# Patient Record
Sex: FEMALE | Race: WHITE | NOT HISPANIC OR LATINO | URBAN - METROPOLITAN AREA
[De-identification: names, ages, dates, MRNs, and addresses within clinical notes are randomized per-mention and may not be internally consistent; named-entity substitution may affect disease eponyms.]

---

## 2017-06-15 ENCOUNTER — OUTPATIENT (OUTPATIENT)
Dept: OUTPATIENT SERVICES | Facility: HOSPITAL | Age: 62
LOS: 1 days | End: 2017-06-15
Payer: MEDICARE

## 2017-06-15 PROCEDURE — 74177 CT ABD & PELVIS W/CONTRAST: CPT | Mod: 26

## 2017-06-15 PROCEDURE — 74177 CT ABD & PELVIS W/CONTRAST: CPT

## 2019-01-04 ENCOUNTER — OUTPATIENT (OUTPATIENT)
Dept: OUTPATIENT SERVICES | Facility: HOSPITAL | Age: 64
LOS: 1 days | Discharge: HOME | End: 2019-01-04
Payer: MEDICARE

## 2019-01-04 ENCOUNTER — RESULT REVIEW (OUTPATIENT)
Age: 64
End: 2019-01-04

## 2019-01-04 VITALS
SYSTOLIC BLOOD PRESSURE: 126 MMHG | DIASTOLIC BLOOD PRESSURE: 60 MMHG | HEIGHT: 67 IN | RESPIRATION RATE: 18 BRPM | TEMPERATURE: 98 F | WEIGHT: 179.02 LBS | HEART RATE: 81 BPM | OXYGEN SATURATION: 96 %

## 2019-01-04 VITALS
SYSTOLIC BLOOD PRESSURE: 114 MMHG | HEART RATE: 82 BPM | DIASTOLIC BLOOD PRESSURE: 50 MMHG | RESPIRATION RATE: 16 BRPM | OXYGEN SATURATION: 94 %

## 2019-01-04 DIAGNOSIS — Z98.890 OTHER SPECIFIED POSTPROCEDURAL STATES: Chronic | ICD-10-CM

## 2019-01-04 PROCEDURE — 88307 TISSUE EXAM BY PATHOLOGIST: CPT

## 2019-01-04 PROCEDURE — 58661 LAPAROSCOPY REMOVE ADNEXA: CPT

## 2019-01-04 PROCEDURE — 58120 DILATION AND CURETTAGE: CPT

## 2019-01-04 PROCEDURE — 86901 BLOOD TYPING SEROLOGIC RH(D): CPT

## 2019-01-04 PROCEDURE — S2900: CPT

## 2019-01-04 PROCEDURE — C1889: CPT

## 2019-01-04 PROCEDURE — 86850 RBC ANTIBODY SCREEN: CPT

## 2019-01-04 PROCEDURE — 88305 TISSUE EXAM BY PATHOLOGIST: CPT

## 2019-01-04 PROCEDURE — 86900 BLOOD TYPING SEROLOGIC ABO: CPT

## 2019-01-04 RX ORDER — SODIUM CHLORIDE 9 MG/ML
1000 INJECTION, SOLUTION INTRAVENOUS
Qty: 0 | Refills: 0 | Status: DISCONTINUED | OUTPATIENT
Start: 2019-01-04 | End: 2019-01-04

## 2019-01-04 RX ORDER — OXYCODONE HYDROCHLORIDE 5 MG/1
5 TABLET ORAL ONCE
Qty: 0 | Refills: 0 | Status: DISCONTINUED | OUTPATIENT
Start: 2019-01-04 | End: 2019-01-04

## 2019-01-04 RX ORDER — MORPHINE SULFATE 50 MG/1
4 CAPSULE, EXTENDED RELEASE ORAL
Qty: 0 | Refills: 0 | Status: DISCONTINUED | OUTPATIENT
Start: 2019-01-04 | End: 2019-01-04

## 2019-01-04 RX ORDER — METOCLOPRAMIDE HCL 10 MG
10 TABLET ORAL ONCE
Qty: 0 | Refills: 0 | Status: DISCONTINUED | OUTPATIENT
Start: 2019-01-04 | End: 2019-01-04

## 2019-01-04 RX ORDER — SIMETHICONE 80 MG/1
80 TABLET, CHEWABLE ORAL ONCE
Qty: 0 | Refills: 0 | Status: DISCONTINUED | OUTPATIENT
Start: 2019-01-04 | End: 2019-01-04

## 2019-01-04 RX ORDER — KETOROLAC TROMETHAMINE 30 MG/ML
30 SYRINGE (ML) INJECTION ONCE
Qty: 0 | Refills: 0 | Status: DISCONTINUED | OUTPATIENT
Start: 2019-01-04 | End: 2019-01-04

## 2019-01-04 RX ORDER — OXYCODONE HYDROCHLORIDE 5 MG/1
10 TABLET ORAL ONCE
Qty: 0 | Refills: 0 | Status: DISCONTINUED | OUTPATIENT
Start: 2019-01-04 | End: 2019-01-04

## 2019-01-04 RX ADMIN — MORPHINE SULFATE 4 MILLIGRAM(S): 50 CAPSULE, EXTENDED RELEASE ORAL at 18:45

## 2019-01-04 RX ADMIN — MORPHINE SULFATE 4 MILLIGRAM(S): 50 CAPSULE, EXTENDED RELEASE ORAL at 19:52

## 2019-01-04 RX ADMIN — Medication 30 MILLIGRAM(S): at 19:10

## 2019-01-04 RX ADMIN — MORPHINE SULFATE 4 MILLIGRAM(S): 50 CAPSULE, EXTENDED RELEASE ORAL at 19:10

## 2019-01-04 NOTE — BRIEF OPERATIVE NOTE - PROCEDURE
<<-----Click on this checkbox to enter Procedure Lysis of adhesions  01/04/2019    Active  [email or web address]  Laparoscopic bilateral salpingo-oophorectomy  01/04/2019    Active  [email or web address]

## 2019-01-04 NOTE — BRIEF OPERATIVE NOTE - OPERATION/FINDINGS
robot aborted due to severe and dense omental/bowel adhesions with mesh to anterior abdominal wall  both fallopian tubes and ovaries grossly appeared normal  subcentimeter cyst visualized in right ovary

## 2019-01-04 NOTE — PACU DISCHARGE NOTE - COMMENTS
pt aao x3.  VSS.  denies c/o nausea or pain.  reports understanding of discharge instructions.  IV discontinued.  pt ambulating with steady gait.  pt discharged to lobby via w/c with transport

## 2023-09-27 PROBLEM — M85.80 OTHER SPECIFIED DISORDERS OF BONE DENSITY AND STRUCTURE, UNSPECIFIED SITE: Chronic | Status: ACTIVE | Noted: 2019-01-03

## 2023-09-27 PROBLEM — N80.9 ENDOMETRIOSIS, UNSPECIFIED: Chronic | Status: ACTIVE | Noted: 2019-01-03

## 2023-09-27 PROBLEM — E04.2 NONTOXIC MULTINODULAR GOITER: Chronic | Status: ACTIVE | Noted: 2019-01-03

## 2023-09-27 PROBLEM — M79.7 FIBROMYALGIA: Chronic | Status: ACTIVE | Noted: 2019-01-03

## 2023-09-27 PROBLEM — N83.9 NONINFLAMMATORY DISORDER OF OVARY, FALLOPIAN TUBE AND BROAD LIGAMENT, UNSPECIFIED: Chronic | Status: ACTIVE | Noted: 2019-01-03

## 2024-01-22 ENCOUNTER — APPOINTMENT (OUTPATIENT)
Dept: HEART AND VASCULAR | Facility: CLINIC | Age: 69
End: 2024-01-22

## 2024-01-25 ENCOUNTER — NON-APPOINTMENT (OUTPATIENT)
Age: 69
End: 2024-01-25

## 2024-01-25 ENCOUNTER — APPOINTMENT (OUTPATIENT)
Dept: HEART AND VASCULAR | Facility: CLINIC | Age: 69
End: 2024-01-25
Payer: MEDICARE

## 2024-01-25 VITALS — WEIGHT: 180 LBS | BODY MASS INDEX: 27.92 KG/M2 | HEIGHT: 67.5 IN | TEMPERATURE: 97.8 F

## 2024-01-25 VITALS — DIASTOLIC BLOOD PRESSURE: 70 MMHG | HEART RATE: 74 BPM | SYSTOLIC BLOOD PRESSURE: 145 MMHG

## 2024-01-25 PROCEDURE — 99203 OFFICE O/P NEW LOW 30 MIN: CPT

## 2024-01-25 PROCEDURE — 93000 ELECTROCARDIOGRAM COMPLETE: CPT

## 2024-02-01 RX ORDER — ROSUVASTATIN CALCIUM 5 MG/1
5 TABLET, FILM COATED ORAL
Refills: 0 | Status: ACTIVE | COMMUNITY

## 2024-02-01 NOTE — ADDENDUM
[FreeTextEntry1] : I, Dr. Trung Constantino personally performed the services described in the documentation, reviewed the documentation as recorded by the scribe, in my presence.  It accurately records my words and actions.  I, DANNY Kim, am scribing for and in the presence of Dr. Constantino for the following sections: history of present illness, past medical/surgical/family/social history, medication reconciliation, allergies, review of systems, vital signs, physical exam and disposition.

## 2024-02-01 NOTE — HISTORY OF PRESENT ILLNESS
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 Constantino. She has been feeling more symptomatic lately with dizziness and palpitations. Two weeks ago on 1/15/24 all day long she was very symptomatic with palpations. She also had an episode of near-syncope while brushing her teeth and lowered herself on the floor. She thought she may have pass out for a min or so. She felt very dizzy for the rest of the day. There are times when dizzy spells occur at rest.  When she has palpitations, palps would last for few mins, and she occasionally would get woken up with them.  These symptoms get more frequently the past 2 months.  She states that she walks at least 1 mile/day, but occasionally gets SOB with walking. As such, she saw Pulm and had PFT that was normal.   Outpatient records showed Stress echo was unremarkable. Echo Stress test (9/2023) was inconclusive, but had normal cardiac cath. She had MCOT in 2022 for 13 days which showed no arrhythmia.   All: codeine -- SOB  SHX: no tob / etoh / recreational drug use PSH: B/L knee replacement (2015) C1-C2 fx surgery (1981), C3-C4 surgery, L123 surgery  Home Meds:  Toprol 50 at night.  Rosuvastatin 5 mg 3 times / week

## 2024-02-01 NOTE — DISCUSSION/SUMMARY
[FreeTextEntry1] : 69 y/o F with history of PVC that was ablated 11 years ago, now returns for evaluation of palpitations and dizziness.  Last heart monitor was in 2022.  She has already ruled out coronary artery disease and has normal LVEF on echo.  1. Dizziness / palpitations: recommend Ziopatch for 2 weeks to see if can catch any arrhythmia.  2. SOB:  Suggest f/u with her pulmonologist to rule out pHTN given her concern for SOB.   She can f/u in 6 weeks. [EKG obtained to assist in diagnosis and management of assessed problem(s)] : EKG obtained to assist in diagnosis and management of assessed problem(s)

## 2024-02-01 NOTE — PHYSICAL EXAM
[No Acute Distress] : no acute distress [Normal] : soft, non-tender, no masses/organomegaly, normal bowel sounds [Normal Gait] : normal gait [No Edema] : no edema [No Focal Deficits] : no focal deficits

## 2024-02-08 ENCOUNTER — APPOINTMENT (OUTPATIENT)
Dept: HEART AND VASCULAR | Facility: CLINIC | Age: 69
End: 2024-02-08
Payer: MEDICARE

## 2024-02-08 PROCEDURE — 93248 EXT ECG>7D<15D REV&INTERPJ: CPT

## 2024-02-22 ENCOUNTER — APPOINTMENT (OUTPATIENT)
Dept: HEART AND VASCULAR | Facility: CLINIC | Age: 69
End: 2024-02-22
Payer: MEDICARE

## 2024-02-22 ENCOUNTER — NON-APPOINTMENT (OUTPATIENT)
Age: 69
End: 2024-02-22

## 2024-02-22 VITALS — SYSTOLIC BLOOD PRESSURE: 132 MMHG | HEART RATE: 71 BPM | DIASTOLIC BLOOD PRESSURE: 69 MMHG

## 2024-02-22 VITALS — HEIGHT: 67.5 IN | TEMPERATURE: 97.5 F | WEIGHT: 179 LBS | BODY MASS INDEX: 27.77 KG/M2

## 2024-02-22 PROCEDURE — 99213 OFFICE O/P EST LOW 20 MIN: CPT

## 2024-02-22 PROCEDURE — 93000 ELECTROCARDIOGRAM COMPLETE: CPT

## 2024-02-22 RX ORDER — METOPROLOL SUCCINATE 50 MG/1
50 TABLET, EXTENDED RELEASE ORAL
Refills: 0 | Status: ACTIVE | COMMUNITY
Start: 2024-02-22

## 2024-02-26 NOTE — PHYSICAL EXAM
[No Acute Distress] : no acute distress [Normal Gait] : normal gait [Normal] : soft, non-tender, no masses/organomegaly, normal bowel sounds [No Edema] : no edema [No Focal Deficits] : no focal deficits [Well Developed] : well developed [Normal S1, S2] : normal S1, S2 [Moves all extremities] : moves all extremities [Alert and Oriented] : alert and oriented

## 2024-02-28 NOTE — HISTORY OF PRESENT ILLNESS
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 Constantino, who presents for follow up  She states 12/2023 she noted more palpitations with associated dizziness.  When she has palpitations, palps would last for few mins, and she occasionally would get woken up with them.  She was given a event monitor that showed heart rates [id number] short burst of SVT <5 seconds. 1.8% PVCs - symptoms correlate with NSR or rare pvcs.  Since her last visit she notes that she is feeling better though has occasional palpitations.  NO syncope, chest pain, edema, SOB.  Outpatient records showed Stress echo was unremarkable. Echo Stress test (9/2023) was inconclusive, but had normal cardiac cath. She had MCOT in 2022 for 13 days which showed no significant arrhythmias, with a PVC burden of 1.7% which occasionally correlated to symptoms. Other patient events on the monitor correlated to sinus rhythm..   All: codeine -- SOB  SHX: no tob / etoh / recreational drug use PSH: B/L knee replacement (2015) C1-C2 fx surgery (1981), C3-C4 surgery, L123 surgery

## 2024-02-28 NOTE — ADDENDUM
[FreeTextEntry1] : I, Trung Constantino, hereby attest that the medical record entry for this patient accurately reflects signatures/notations that I made on the Date of Service in my capacity as an Attending Physician when I treated/diagnosed the above patient. I do hereby attest that this information is true, accurate and complete to the best of my knowledge and I understand that any falsification, omission, or concealment of material fact may subject me to administrative, civil, or, criminal liability. I agree with the note as written by my PA in its entirety. I was present for the entire visit and supervised the entire visit and agree with the plan as outlined.   I, Damien Morris, am scribing for and the presence of Dr. Constantino the following sections: HPI, PMH,Family/social history, ROS, Physical Exam, Assessment / Plan.

## 2024-02-28 NOTE — DISCUSSION/SUMMARY
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  Event monitor with infrequent PVCs.  Symptoms correlate with rate PVC and NSR.  She was reassured.  She will remain active and stay hydrated.  SHe overall is feeling better and if symptoms worsen can consider adding low dose BB or CCB.  FOllow up in 4 months or sooner if needed.  She knows to call with any questions or concerns.

## 2024-06-13 ENCOUNTER — NON-APPOINTMENT (OUTPATIENT)
Age: 69
End: 2024-06-13

## 2024-06-13 ENCOUNTER — APPOINTMENT (OUTPATIENT)
Dept: HEART AND VASCULAR | Facility: CLINIC | Age: 69
End: 2024-06-13
Payer: MEDICARE

## 2024-06-13 VITALS
HEIGHT: 67.5 IN | TEMPERATURE: 96.5 F | WEIGHT: 186 LBS | HEART RATE: 74 BPM | BODY MASS INDEX: 28.85 KG/M2 | DIASTOLIC BLOOD PRESSURE: 65 MMHG | SYSTOLIC BLOOD PRESSURE: 142 MMHG | OXYGEN SATURATION: 94 %

## 2024-06-13 DIAGNOSIS — I49.3 VENTRICULAR PREMATURE DEPOLARIZATION: ICD-10-CM

## 2024-06-13 PROCEDURE — 93000 ELECTROCARDIOGRAM COMPLETE: CPT

## 2024-06-13 PROCEDURE — 99214 OFFICE O/P EST MOD 30 MIN: CPT

## 2024-06-13 NOTE — PHYSICAL EXAM
[Well Developed] : well developed [No Acute Distress] : no acute distress [Normal S1, S2] : normal S1, S2 [Normal] : clear lung fields, good air entry, no respiratory distress [Normal Gait] : normal gait [No Edema] : no edema [Moves all extremities] : moves all extremities [Alert and Oriented] : alert and oriented [Well Nourished] : well nourished [Normal Conjunctiva] : normal conjunctiva [Clear Lung Fields] : clear lung fields [Good Air Entry] : good air entry [No Respiratory Distress] : no respiratory distress  [Normal Speech] : normal speech

## 2024-06-19 NOTE — DISCUSSION/SUMMARY
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  Event monitor in the past 6 months with infrequent PVCs.  Symptoms correlate with rate PVC and NSR.  She had one episode of rapid palpitations lasting less then a minute.  We discussed this is likely an SVT (AT) but that we need to be aware of longer episodes.  We discussed with age she is at risk for atrial fibrillation.  We encouraged monitoring with [employer] or an apple watch.  If she has longer episodes we can consider a event monitor.  We discussed the utility of a implantable loop recorder.  For now she will use external monitoring.  She will increase her Crestor to 5 times a week.   She will remain active and stay hydrated.   Follow up in 4 months or sooner if needed.  She knows to call with any questions or concerns.  Is Xerosis Present?: Yes - Normal Treatment

## 2024-06-19 NOTE — HISTORY OF PRESENT ILLNESS
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  When she has palpitations, palps would last for few mins, and she occasionally would get woken up with them.  She was given a event monitor that showed heart rates [id number] short burst of SVT <5 seconds. 1.8% PVCs - symptoms correlate with NSR or rare pvcs.  Since her last visit she has overall done well.  She does not feel PVCs much but states last week she noted 50 seconds of rapid palpitations.  She has never felt anything like this before.  No clear precipitating factor and no alleviating factors.  She is upset her LDL is up.  She just joined a gym and swims 3 days a week to try and help lose some weight.  No syncope, chest pain, edema, SOB.  Outpatient records showed Stress echo was unremarkable. Echo Stress test (9/2023) was inconclusive, but had normal cardiac cath. She had MCOT in 2022 for 13 days which showed no significant arrhythmias, with a PVC burden of 1.7% which occasionally correlated to symptoms. Other patient events on the monitor correlated to sinus rhythm..

## 2024-06-19 NOTE — CARDIOLOGY SUMMARY
[de-identified] : 1/25/24: NSR 77 bpm. Non-specific ST changes in lateral leads.  2/22/24 NSR at 71bpm 6/7/24 sinus at 77bpm

## 2024-06-19 NOTE — REVIEW OF SYSTEMS
[Negative] : Heme/Lymph [Fever] : no fever [Chills] : no chills [Feeling Fatigued] : not feeling fatigued [Cough] : no cough [Wheezing] : no wheezing [FreeTextEntry5] : see HPI

## 2024-09-11 ENCOUNTER — NON-APPOINTMENT (OUTPATIENT)
Age: 69
End: 2024-09-11

## 2024-09-11 ENCOUNTER — APPOINTMENT (OUTPATIENT)
Dept: HEART AND VASCULAR | Facility: CLINIC | Age: 69
End: 2024-09-11
Payer: MEDICARE

## 2024-09-11 VITALS
SYSTOLIC BLOOD PRESSURE: 139 MMHG | WEIGHT: 179 LBS | OXYGEN SATURATION: 99 % | TEMPERATURE: 97.6 F | HEART RATE: 67 BPM | DIASTOLIC BLOOD PRESSURE: 77 MMHG | HEIGHT: 67.5 IN | BODY MASS INDEX: 27.77 KG/M2

## 2024-09-11 DIAGNOSIS — I49.3 VENTRICULAR PREMATURE DEPOLARIZATION: ICD-10-CM

## 2024-09-11 PROCEDURE — 93000 ELECTROCARDIOGRAM COMPLETE: CPT

## 2024-09-11 PROCEDURE — G2211 COMPLEX E/M VISIT ADD ON: CPT

## 2024-09-11 PROCEDURE — 99213 OFFICE O/P EST LOW 20 MIN: CPT

## 2024-09-15 NOTE — CARDIOLOGY SUMMARY
[de-identified] : 1/25/24: NSR 77 bpm. Non-specific ST changes in lateral leads.  2/22/24 NSR at 71bpm 6/7/24 sinus at 77bpm 9/11/24  sinus at 62

## 2024-09-15 NOTE — DISCUSSION/SUMMARY
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 Constantino, who presents for follow up  She has already ruled out coronary artery disease and has normal LVEF on echo.  Event monitor in the past 6 months with infrequent PVCs.  Symptoms correlate with rate PVC and NSR.  She will continue to use her smart watch.  If she has longer episodes we can consider an event monitor.  We discussed the utility of a implantable loop recorder.  For now she will use external monitoring.  She will increase her Crestor to 5 times a week.   She will remain active and stay hydrated.   Follow up in 6 months or sooner if needed.  She knows to call with any questions or concerns.  [EKG obtained to assist in diagnosis and management of assessed problem(s)] : EKG obtained to assist in diagnosis and management of assessed problem(s)

## 2024-09-15 NOTE — DISCUSSION/SUMMARY
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 Constantino, who presents for follow up  She has already ruled out coronary artery disease and has normal LVEF on echo.  Event monitor in the past 6 months with infrequent PVCs.  Symptoms correlate with rate PVC and NSR.  She will continue to use her smart watch.  If she has longer episodes we can consider an event monitor.  We discussed the utility of a implantable loop recorder.  For now she will use external monitoring.  She will increase her Crestor to 5 times a week.   She will remain active and stay hydrated.   Follow up in 6 months or sooner if needed.  She knows to call with any questions or concerns.  [EKG obtained to assist in diagnosis and management of assessed problem(s)] : EKG obtained to assist in diagnosis and management of assessed problem(s)

## 2024-09-15 NOTE — ADDENDUM
[FreeTextEntry1] : I, Trung Consatntino, hereby attest that the medical record entry for this patient accurately reflects signatures/notations that I made on the Date of Service in my capacity as an Attending Physician when I treated/diagnosed the above patient. I do hereby attest that this information is true, accurate and complete to the best of my knowledge and I understand that any falsification, omission, or concealment of material fact may subject me to administrative, civil, or, criminal liability. I agree with the note as written by my PA in its entirety. I was present for the entire visit and supervised the entire visit and agree with the plan as outlined.   I, Damien Morris, am scribing for and the presence of Dr. Constantino the following sections: HPI, PMH,Family/social history, ROS, Physical Exam, Assessment / Plan.

## 2024-09-15 NOTE — HISTORY OF PRESENT ILLNESS
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 Constantino, who presents for follow up  She states 12/2023 she noted more palpitations with associated dizziness.  When she has palpitations, palps would last for few mins, and she occasionally would get woken up with them.  She was given a event monitor that showed heart rates [id number] short burst of SVT <5 seconds. 1.8% PVCs - symptoms correlate with NSR or rare pvcs.  Since her last visit she has overall done well.    Outpatient records showed Stress echo was unremarkable. Echo Stress test (9/2023) was inconclusive, but had normal cardiac cath. She had MCOT in 2022 for 13 days which showed no significant arrhythmias, with a PVC burden of 1.7% which occasionally correlated to symptoms. Other patient events on the monitor correlated to sinus rhythm..     Since her last visit she is doing well.  She had one mild episode of palpitations while on the treadmill.  No other episodes.  No chest pain, syncope, near syncope or SOB.

## 2024-09-15 NOTE — REVIEW OF SYSTEMS
[Negative] : Heme/Lymph [Fever] : no fever [Weight Gain (___ Lbs)] : no recent weight gain [Chills] : no chills [Feeling Fatigued] : not feeling fatigued [Weight Loss (___ Lbs)] : no recent weight loss [Dyspnea on exertion] : not dyspnea during exertion [Orthopnea] : no orthopnea [Chest Discomfort] : no chest discomfort [Syncope] : no syncope [Cough] : no cough [Wheezing] : no wheezing [FreeTextEntry5] : see HPI

## 2024-09-15 NOTE — REVIEW OF SYSTEMS
[Negative] : Heme/Lymph [Fever] : no fever [Weight Gain (___ Lbs)] : no recent weight gain [Chills] : no chills [Feeling Fatigued] : not feeling fatigued [Weight Loss (___ Lbs)] : no recent weight loss [Dyspnea on exertion] : not dyspnea during exertion [Chest Discomfort] : no chest discomfort [Orthopnea] : no orthopnea [Syncope] : no syncope [Cough] : no cough [Wheezing] : no wheezing [FreeTextEntry5] : see HPI

## 2024-09-15 NOTE — CARDIOLOGY SUMMARY
[de-identified] : 1/25/24: NSR 77 bpm. Non-specific ST changes in lateral leads.  2/22/24 NSR at 71bpm 6/7/24 sinus at 77bpm 9/11/24  sinus at 62

## 2024-09-15 NOTE — PHYSICAL EXAM
[Well Developed] : well developed [Well Nourished] : well nourished [No Acute Distress] : no acute distress [Normal Conjunctiva] : normal conjunctiva [Normal S1, S2] : normal S1, S2 [Normal] : clear lung fields, good air entry, no respiratory distress [Clear Lung Fields] : clear lung fields [Good Air Entry] : good air entry [No Respiratory Distress] : no respiratory distress  [Normal Gait] : normal gait [No Edema] : no edema [Moves all extremities] : moves all extremities [Normal Speech] : normal speech [Alert and Oriented] : alert and oriented [No Murmur] : no murmur

## 2024-09-15 NOTE — CARDIOLOGY SUMMARY
[de-identified] : 1/25/24: NSR 77 bpm. Non-specific ST changes in lateral leads.  2/22/24 NSR at 71bpm 6/7/24 sinus at 77bpm 9/11/24  sinus at 62

## 2024-09-15 NOTE — DISCUSSION/SUMMARY
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  Event monitor in the past 6 months with infrequent PVCs.  Symptoms correlate with rate PVC and NSR.  She will continue to use her smart watch.  If she has longer episodes we can consider an event monitor.  We discussed the utility of a implantable loop recorder.  For now she will use external monitoring.  She will increase her Crestor to 5 times a week.   She will remain active and stay hydrated.   Follow up in 6 months or sooner if needed.  She knows to call with any questions or concerns.  [EKG obtained to assist in diagnosis and management of assessed problem(s)] : EKG obtained to assist in diagnosis and management of assessed problem(s)

## 2024-09-15 NOTE — HISTORY OF PRESENT ILLNESS
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 Constantino, who presents for follow up  She states 12/2023 she noted more palpitations with associated dizziness.  When she has palpitations, palps would last for few mins, and she occasionally would get woken up with them.  She was given a event monitor that showed heart rates [id number] short burst of SVT <5 seconds. 1.8% PVCs - symptoms correlate with NSR or rare pvcs.  Since her last visit she has overall done well.    Outpatient records showed Stress echo was unremarkable. Echo Stress test (9/2023) was inconclusive, but had normal cardiac cath. She had MCOT in 2022 for 13 days which showed no significant arrhythmias, with a PVC burden of 1.7% which occasionally correlated to symptoms. Other patient events on the monitor correlated to sinus rhythm..     Since her last visit she is doing well.  She had one mild episode of palpitations while on the treadmill.  No other episodes.  No chest pain, syncope, near syncope or SOB.

## 2024-09-15 NOTE — HISTORY OF PRESENT ILLNESS
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  When she has palpitations, palps would last for few mins, and she occasionally would get woken up with them.  She was given a event monitor that showed heart rates [id number] short burst of SVT <5 seconds. 1.8% PVCs - symptoms correlate with NSR or rare pvcs.  Since her last visit she has overall done well.    Outpatient records showed Stress echo was unremarkable. Echo Stress test (9/2023) was inconclusive, but had normal cardiac cath. She had MCOT in 2022 for 13 days which showed no significant arrhythmias, with a PVC burden of 1.7% which occasionally correlated to symptoms. Other patient events on the monitor correlated to sinus rhythm..     Since her last visit she is doing well.  She had one mild episode of palpitations while on the treadmill.  No other episodes.  No chest pain, syncope, near syncope or SOB.

## 2024-09-15 NOTE — CARDIOLOGY SUMMARY
[de-identified] : 1/25/24: NSR 77 bpm. Non-specific ST changes in lateral leads.  2/22/24 NSR at 71bpm 6/7/24 sinus at 77bpm 9/11/24  sinus at 62

## 2024-09-15 NOTE — DISCUSSION/SUMMARY
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  Event monitor in the past 6 months with infrequent PVCs.  Symptoms correlate with rate PVC and NSR.  She will continue to use her smart watch.  If she has longer episodes we can consider an event monitor.  We discussed the utility of a implantable loop recorder.  For now she will use external monitoring.  She will increase her Crestor to 5 times a week.   She will remain active and stay hydrated.   Follow up in 6 months or sooner if needed.  She knows to call with any questions or concerns.  [EKG obtained to assist in diagnosis and management of assessed problem(s)] : EKG obtained to assist in diagnosis and management of assessed problem(s)

## 2024-09-15 NOTE — HISTORY OF PRESENT ILLNESS
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  When she has palpitations, palps would last for few mins, and she occasionally would get woken up with them.  She was given a event monitor that showed heart rates [id number] short burst of SVT <5 seconds. 1.8% PVCs - symptoms correlate with NSR or rare pvcs.  Since her last visit she has overall done well.    Outpatient records showed Stress echo was unremarkable. Echo Stress test (9/2023) was inconclusive, but had normal cardiac cath. She had MCOT in 2022 for 13 days which showed no significant arrhythmias, with a PVC burden of 1.7% which occasionally correlated to symptoms. Other patient events on the monitor correlated to sinus rhythm..     Since her last visit she is doing well.  She had one mild episode of palpitations while on the treadmill.  No other episodes.  No chest pain, syncope, near syncope or SOB.

## 2025-05-09 ENCOUNTER — NON-APPOINTMENT (OUTPATIENT)
Age: 70
End: 2025-05-09

## 2025-05-14 ENCOUNTER — NON-APPOINTMENT (OUTPATIENT)
Age: 70
End: 2025-05-14

## 2025-05-14 ENCOUNTER — APPOINTMENT (OUTPATIENT)
Dept: HEART AND VASCULAR | Facility: CLINIC | Age: 70
End: 2025-05-14
Payer: MEDICARE

## 2025-05-14 VITALS
WEIGHT: 179 LBS | TEMPERATURE: 97.9 F | DIASTOLIC BLOOD PRESSURE: 78 MMHG | HEART RATE: 90 BPM | BODY MASS INDEX: 27.77 KG/M2 | HEIGHT: 67.5 IN | SYSTOLIC BLOOD PRESSURE: 142 MMHG | OXYGEN SATURATION: 97 %

## 2025-05-14 DIAGNOSIS — I49.3 VENTRICULAR PREMATURE DEPOLARIZATION: ICD-10-CM

## 2025-05-14 PROCEDURE — 93000 ELECTROCARDIOGRAM COMPLETE: CPT

## 2025-05-14 PROCEDURE — 99213 OFFICE O/P EST LOW 20 MIN: CPT
